# Patient Record
Sex: MALE | Race: BLACK OR AFRICAN AMERICAN | URBAN - NONMETROPOLITAN AREA
[De-identification: names, ages, dates, MRNs, and addresses within clinical notes are randomized per-mention and may not be internally consistent; named-entity substitution may affect disease eponyms.]

---

## 2021-06-03 ENCOUNTER — OFFICE VISIT (OUTPATIENT)
Dept: FAMILY MEDICINE CLINIC | Age: 41
End: 2021-06-03
Payer: COMMERCIAL

## 2021-06-03 VITALS
RESPIRATION RATE: 16 BRPM | TEMPERATURE: 98.6 F | HEART RATE: 97 BPM | HEIGHT: 71 IN | OXYGEN SATURATION: 98 % | DIASTOLIC BLOOD PRESSURE: 80 MMHG | SYSTOLIC BLOOD PRESSURE: 132 MMHG | BODY MASS INDEX: 32.09 KG/M2 | WEIGHT: 229.2 LBS

## 2021-06-03 DIAGNOSIS — R80.9 TYPE 2 DIABETES MELLITUS WITH MICROALBUMINURIA, WITH LONG-TERM CURRENT USE OF INSULIN (HCC): Primary | ICD-10-CM

## 2021-06-03 DIAGNOSIS — Z59.41 FOOD INSECURITY: ICD-10-CM

## 2021-06-03 DIAGNOSIS — Z79.4 TYPE 2 DIABETES MELLITUS WITH MICROALBUMINURIA, WITH LONG-TERM CURRENT USE OF INSULIN (HCC): Primary | ICD-10-CM

## 2021-06-03 DIAGNOSIS — N52.1 ERECTILE DYSFUNCTION DUE TO DISEASES CLASSIFIED ELSEWHERE: ICD-10-CM

## 2021-06-03 DIAGNOSIS — D64.9 ANEMIA, UNSPECIFIED TYPE: ICD-10-CM

## 2021-06-03 DIAGNOSIS — E11.29 TYPE 2 DIABETES MELLITUS WITH MICROALBUMINURIA, WITH LONG-TERM CURRENT USE OF INSULIN (HCC): Primary | ICD-10-CM

## 2021-06-03 DIAGNOSIS — Z13.220 SCREENING FOR HYPERCHOLESTEROLEMIA: ICD-10-CM

## 2021-06-03 LAB
HBA1C MFR BLD: 9.9 % (ref 4.3–5.7)
MICROALB/CREAT RATIO POC: ABNORMAL MG/G
MICROALBUMIN/CREAT UR-RTO: 80 MG/L
POC CREATININE: 300 MG/DL

## 2021-06-03 PROCEDURE — 3046F HEMOGLOBIN A1C LEVEL >9.0%: CPT | Performed by: STUDENT IN AN ORGANIZED HEALTH CARE EDUCATION/TRAINING PROGRAM

## 2021-06-03 PROCEDURE — G8417 CALC BMI ABV UP PARAM F/U: HCPCS | Performed by: STUDENT IN AN ORGANIZED HEALTH CARE EDUCATION/TRAINING PROGRAM

## 2021-06-03 PROCEDURE — 2022F DILAT RTA XM EVC RTNOPTHY: CPT | Performed by: STUDENT IN AN ORGANIZED HEALTH CARE EDUCATION/TRAINING PROGRAM

## 2021-06-03 PROCEDURE — 4004F PT TOBACCO SCREEN RCVD TLK: CPT | Performed by: STUDENT IN AN ORGANIZED HEALTH CARE EDUCATION/TRAINING PROGRAM

## 2021-06-03 PROCEDURE — 99204 OFFICE O/P NEW MOD 45 MIN: CPT | Performed by: STUDENT IN AN ORGANIZED HEALTH CARE EDUCATION/TRAINING PROGRAM

## 2021-06-03 PROCEDURE — G8427 DOCREV CUR MEDS BY ELIG CLIN: HCPCS | Performed by: STUDENT IN AN ORGANIZED HEALTH CARE EDUCATION/TRAINING PROGRAM

## 2021-06-03 RX ORDER — INSULIN GLARGINE 100 [IU]/ML
50 INJECTION, SOLUTION SUBCUTANEOUS NIGHTLY
Qty: 15 PEN | Refills: 1 | Status: SHIPPED | OUTPATIENT
Start: 2021-06-03 | End: 2021-07-21 | Stop reason: SDUPTHER

## 2021-06-03 RX ORDER — LANCETS 30 GAUGE
1 EACH MISCELLANEOUS 3 TIMES DAILY
Qty: 600 EACH | Refills: 0 | Status: SHIPPED | OUTPATIENT
Start: 2021-06-03

## 2021-06-03 RX ORDER — LANCETS 30 GAUGE
1 EACH MISCELLANEOUS DAILY
Qty: 300 EACH | Refills: 1 | Status: CANCELLED | OUTPATIENT
Start: 2021-06-03

## 2021-06-03 RX ORDER — SILDENAFIL 50 MG/1
50 TABLET, FILM COATED ORAL DAILY PRN
Qty: 10 TABLET | Refills: 0 | Status: SHIPPED | OUTPATIENT
Start: 2021-06-03 | End: 2021-06-10

## 2021-06-03 RX ORDER — BLOOD-GLUCOSE METER
1 KIT MISCELLANEOUS DAILY
Qty: 1 KIT | Refills: 0 | Status: SHIPPED | OUTPATIENT
Start: 2021-06-03

## 2021-06-03 RX ORDER — BLOOD PRESSURE TEST KIT
1 KIT MISCELLANEOUS 3 TIMES DAILY
Qty: 100 EACH | Refills: 0 | Status: SHIPPED | OUTPATIENT
Start: 2021-06-03

## 2021-06-03 RX ORDER — GLUCOSAMINE HCL/CHONDROITIN SU 500-400 MG
CAPSULE ORAL
Qty: 300 STRIP | Refills: 0 | Status: SHIPPED | OUTPATIENT
Start: 2021-06-03

## 2021-06-03 RX ORDER — LISINOPRIL 10 MG/1
10 TABLET ORAL DAILY
Qty: 30 TABLET | Refills: 0 | Status: SHIPPED | OUTPATIENT
Start: 2021-06-03

## 2021-06-03 RX ORDER — INSULIN LISPRO 100 [IU]/ML
10 INJECTION, SOLUTION INTRAVENOUS; SUBCUTANEOUS
Qty: 3 PEN | Refills: 1 | Status: SHIPPED | OUTPATIENT
Start: 2021-06-03 | End: 2021-07-21 | Stop reason: SDUPTHER

## 2021-06-03 SDOH — ECONOMIC STABILITY - FOOD INSECURITY: FOOD INSECURITY: Z59.41

## 2021-06-03 SDOH — ECONOMIC STABILITY: FOOD INSECURITY: WITHIN THE PAST 12 MONTHS, THE FOOD YOU BOUGHT JUST DIDN'T LAST AND YOU DIDN'T HAVE MONEY TO GET MORE.: OFTEN TRUE

## 2021-06-03 SDOH — ECONOMIC STABILITY: FOOD INSECURITY: WITHIN THE PAST 12 MONTHS, YOU WORRIED THAT YOUR FOOD WOULD RUN OUT BEFORE YOU GOT MONEY TO BUY MORE.: OFTEN TRUE

## 2021-06-03 ASSESSMENT — PATIENT HEALTH QUESTIONNAIRE - PHQ9
2. FEELING DOWN, DEPRESSED OR HOPELESS: 0
SUM OF ALL RESPONSES TO PHQ QUESTIONS 1-9: 0
1. LITTLE INTEREST OR PLEASURE IN DOING THINGS: 0
SUM OF ALL RESPONSES TO PHQ9 QUESTIONS 1 & 2: 0

## 2021-06-03 ASSESSMENT — SOCIAL DETERMINANTS OF HEALTH (SDOH): HOW HARD IS IT FOR YOU TO PAY FOR THE VERY BASICS LIKE FOOD, HOUSING, MEDICAL CARE, AND HEATING?: VERY HARD

## 2021-06-03 ASSESSMENT — ENCOUNTER SYMPTOMS
ABDOMINAL PAIN: 0
COLOR CHANGE: 0
NAUSEA: 0
VOMITING: 0
COUGH: 0
SHORTNESS OF BREATH: 0

## 2021-06-03 NOTE — PATIENT INSTRUCTIONS
Thank you   1. Thank you for trusting us with your healthcare needs. You may receive a survey regarding today's visit. It would help us out if you would take a few moments to provide your feedback. We value your input. 2. Please bring in ALL medications BOTTLES, including inhalers, herbal supplements, over the counter, prescribed & non-prescribed medicine. The office would like actual medication bottles and a list.   3. Please note our OFFICE POLICIES:   a. Prior to getting your labs drawn, please check with your insurance company for benefits and eligibility of lab services. Often, insurance companies cover certain tests for preventative visits only. It is patient's responsibility to see what is covered. b. We are unable to change a diagnosis after the test has been performed. c. Lab orders will not be re-printed. Please hold onto your original lab orders and take them to your lab to be completed. d. If you no show your scheduled appointment three times, you will be dismissed from this practice. e. Ronna Grime must be completed 24 hours prior to your schedule appointment. 4. If the list below has been completed, PLEASE FAX RECORDS TO OUR OFFICE @ [phone]. Once the records have been received we will update your records at our office:  Health Maintenance Due   Topic Date Due    Hepatitis C screen  Never done    HIV screen  Never done    DTaP/Tdap/Td vaccine (1 - Tdap) Never done    Lipid screen  Never done    Diabetes screen  Never done           Tobacco Cessation Programs     Telephonic behavior modification  1-800-QUIT-NOW (140-2948)Patient Education        Home Blood Sugar Test: About This Test  What is it? A home blood sugar test measures the amount of sugar (glucose) in your blood, using a small device called a blood sugar meter. It's a quick way to test your blood sugar anywhere, at any time. Why is this test done?   Testing your blood sugar helps you know if your levels are in your target range. It helps you know when to take action and may help you avoid blood sugar emergencies. Testing also helps you learn how things like exercise, stress, and what you eat can affect your blood sugar. What happens before the test?  The supplies you will need for testing blood sugar include:  · A blood glucose meter. · Testing strips. These are made to be used with a specific model of meter. Make sure the strips haven't . · Sugar control solutions. Some meters require a specific solution. Many new meters are made to operate without a control solution. · Short needles called lancets for pricking your skin. · A pen-sized castillo for the lancet (lancet device). It positions the lancet and controls how deeply it goes into your skin. · Clean cotton balls. These are used to stop the bleeding from the testing site. What happens during the test?  Checking your blood sugar involves pricking your finger, palm, or forearm with a lancet to collect a drop of blood. The blood drop is placed on a test strip, which you insert into the blood glucose meter. The instructions for testing are slightly different for each blood glucose meter model. Follow the instructions that came with your meter. · Wash your hands with warm, soapy water. Dry them well with a clean towel. You may also use an alcohol wipe to clean your finger or other site. But make sure your hands are dry before the test.  · Insert a clean lancet into the lancet device. · Remove a test strip from the test strip bottle. Replace the lid right away to keep moisture away from the other strips. · Follow the instructions that came with your meter to get it ready. · Use the lancet device to stick the side of your fingertip with the lancet. Do not stick the tip of your finger. Some blood sugar meters use lancet devices that take the blood sample from other sites, such as the palm of the hand or the forearm.  But the finger is usually the most accurate http://[place]. [employer]uit[employer]. org   Online support program to help patients through each step of the quitting process. Available 24 hours a day 7 days a week. Provides up to date researched based tool, step-by-step guides, and motivational messages. Online behavior modification   www.lungusa.org/stop-smoking/how-to-quit   HelpLine: 1-800-LUNG-USA (093-6916)   Email questions to:  Shraddha@[employer]. org    Website offers resources to help tobacco users figure out their reasons for quitting and then take the big step of quitting for good. Hypnosis   Location: [facility] [employer] [facility], [employer] [address]   Contact: Claire Sultana, PhD at [phone]   Hypnosis for tobacco cessation   Cost $225 for the initial session and $175 for each session afterwards. Most patients require 6-8 sessions. There is the option to submit through the patients insurance. Hypnosis and behavior modification   Location: [facility],  [address] [employer] [address]   Contact: Chance Reyes, PhD at [phone]  [facility] and hypnosis for nicotine addition   Cost: For uninsured patients:  Please call above phone number  Cost for insured patients depends on patients insurance plan. Behavior modification   Location: [facility], [facility] [employer] [address]   Contact: Grady Segura include four one-on-one appointments between the patient and a respiratory therapist.  The four appointments span over three weeks. The respiratory therapist schedules one of the appointments to occur 48 hours after the patients quit date.  Cost $100 total for the four sessions.   Tobacco cessation products are not included in the cost and are not provided by [facility].

## 2021-06-03 NOTE — LETTER
[address] [place] [address],[address] [facility] [address] [place], [place] [address]  Phone: [phone]  Fax: [phone]    Ambar Mahoney MD        Annemarie 3, 2021     Patient: Kristina Collins   YOB: 1980   Date of Visit: 6/3/2021       To Whom It May Concern: It is my medical opinion that Travis Saab should be allowed to have outside food brought in because of his Type 2 Diabetes. He is on Insulin and needs to maintain a specific diet in order to keep his diabetes under control. If you have any questions or concerns, please don't hesitate to call.     Sincerely,        Ambar Mahoney MD

## 2021-06-03 NOTE — PROGRESS NOTES
[address] [facility] [address] [address]  Dept: [phone]  Dept Fax: [phone]  Loc: Td Davenport is a 41 y.o. male who presents today for:  Chief Complaint   Patient presents with    New Patient     Establish and Discuss Diabetes       HPI:   New patient, here to establish care. DM: Hx of DM since 2008; has been on insulin since 2012. Patient recently released from prison, needs to re-establish with a PCP, has not seen a PCP in many years. States that he was receiving insulin while in prison. He was released 2 days and states they sent him out with a few day supply of insulin but the vials are all broken. He has been on Humalong 35units TID and sliding scale. Now staying at a [facility] and worried his diet is really bad, they do not provide anything healthy for him to eat. Erectile dysfunction: Had dealt with ED for many years secondary to his DM and it's affecting his relationship with his girlfriend now, would like to be on medication. Hx of HTN but not on medications any longer. Past Medical History:   Diagnosis Date    Hypertension     Type 2 diabetes mellitus without complication ([address]       History reviewed. No pertinent surgical history. Family History   Problem Relation Age of Onset    Diabetes Mother     High Blood Pressure Mother     High Cholesterol Mother     Anxiety Disorder Mother     Depression Mother      Social History     Tobacco Use    Smoking status: Current Some Day Smoker     Types: Cigarettes    Smokeless tobacco: Never Used   Substance Use Topics    Alcohol use: Not Currently      Current Outpatient Medications   Medication Sig Dispense Refill    blood glucose monitor strips Test 3 times a day before meals & as needed for symptoms of irregular blood glucose.  Dispense sufficient amount for indicated testing frequency plus additional to accommodate PRN testing needs. 300 strip 0    glucose monitoring kit (FREESTYLE) monitoring kit 1 kit by Does not apply route daily 1 kit 0    lisinopril (PRINIVIL;ZESTRIL) 10 MG tablet Take 1 tablet by mouth daily 30 tablet 0    Lancets MISC 1 each by Does not apply route 3 times daily 600 each 0    Alcohol Swabs PADS 1 box by Does not apply route 3 times daily 100 each 0    insulin glargine (LANTUS SOLOSTAR) 100 UNIT/ML injection pen Inject 50 Units into the skin nightly 15 pen 1    insulin lispro, 1 Unit Dial, (HUMALOG KWIKPEN) 100 UNIT/ML SOPN Inject 10 Units into the skin 3 times daily (before meals) Inject 10 units into skin 3 times daily with meals + 2units for 150-200; 4units 200-250; 6 units 300-350; 8 units  3 pen 1    sildenafil (VIAGRA) 50 MG tablet Take 1 tablet by mouth daily as needed for Erectile Dysfunction 10 tablet 0     No current facility-administered medications for this visit. No Known Allergies    Health Maintenance   Topic Date Due    Pneumococcal 0-64 years Vaccine (1 of 2 - PPSV23) Never done    Diabetic foot exam  Never done    Diabetic retinal exam  Never done    Lipid screen  Never done    Hepatitis B vaccine (1 of 3 - Risk 3-dose series) Never done    DTaP/Tdap/Td vaccine (1 - Tdap) Never done    Potassium monitoring  05/20/2013    Creatinine monitoring  05/20/2013    Hepatitis C screen  06/03/2022 (Originally 1980)    HIV screen  06/03/2022 (Originally 12/12/1993)    Flu vaccine (Season Ended) 09/01/2021    A1C test (Diabetic or Prediabetic)  09/03/2021    COVID-19 Vaccine  Completed    Hepatitis A vaccine  Aged Out    Hib vaccine  Aged Out    Meningococcal (ACWY) vaccine  Aged Out       Subjective:      Review of Systems   Constitutional: Negative for chills and fever. Respiratory: Negative for cough and shortness of breath. Cardiovascular: Negative for chest pain and palpitations. Gastrointestinal: Negative for abdominal pain, nausea and vomiting. Skin: Negative for color change and rash. Neurological: Negative for weakness, numbness and headaches. Psychiatric/Behavioral: Negative for decreased concentration. The patient is not nervous/anxious. Objective:     Vitals:    06/03/21 1406   BP: 132/80   Site: Left Upper Arm   Position: Sitting   Cuff Size: Large Adult   Pulse: 97   Resp: 16   Temp: 98.6 °F (37 °C)   TempSrc: Oral   SpO2: 98%   Weight: 229 lb 3.2 oz (104 kg)   Height: 5' 11\" (1.803 m)       Body mass index is 31.97 kg/m². Wt Readings from Last 3 Encounters:   06/03/21 229 lb 3.2 oz (104 kg)     BP Readings from Last 3 Encounters:   06/03/21 132/80       Physical Exam  Vitals reviewed. Constitutional:       Appearance: He is obese. HENT:      Head: Normocephalic and atraumatic. Right Ear: External ear normal.      Left Ear: External ear normal.   Eyes:      Conjunctiva/sclera: Conjunctivae normal.   Cardiovascular:      Rate and Rhythm: Normal rate and regular rhythm. Pulses: Normal pulses. Heart sounds: Normal heart sounds. Pulmonary:      Effort: Pulmonary effort is normal.      Breath sounds: Normal breath sounds. Musculoskeletal:      Right lower leg: No edema. Left lower leg: No edema. Skin:     General: Skin is warm and dry. Neurological:      Mental Status: He is oriented to person, place, and time. Psychiatric:         Mood and Affect: Mood normal.         Behavior: Behavior normal.         Lab Results   Component Value Date    WBC 5.6 05/17/2012    HCT 41.3 (L) 05/17/2012     05/17/2012     05/20/2012    K 4.1 05/20/2012     05/20/2012    CREATININE 300 06/03/2021    BUN 12 05/20/2012    CO2 32 05/20/2012    LABA1C 9.9 (H) 06/03/2021    LABGLOM >90 05/20/2012       Imaging Results:    No results found. Assessment / Plan:     Gumaro Price was seen today for new patient.     Diagnoses and all orders for this visit:    Type 2 diabetes mellitus with microalbuminuria, with long-term current use of insulin ([facility]  - Uncontrolled. A1c 9.9%   - Will start on Lantus 50 units nightly and Humalog 10units TID with meals with sliding scale  - Monitor blood sugars at home  - Microalbuminuria today - will start on Lisinopril  - Check lipid panel for LDL - will need statin therapy  - Will need DM foot exam and retinal exam   -     POCT microalbumin  -     POCT glycosylated hemoglobin (Hb A1C)  -     Comprehensive Metabolic Panel; Future  -     blood glucose monitor strips; Test 3 times a day before meals & as needed for symptoms of irregular blood glucose. Dispense sufficient amount for indicated testing frequency plus additional to accommodate PRN testing needs.  -     glucose monitoring kit (FREESTYLE) monitoring kit; 1 kit by Does not apply route daily  -     lisinopril (PRINIVIL;ZESTRIL) 10 MG tablet; Take 1 tablet by mouth daily  -     TSH; Future  -     T4, Free; Future  -     Lancets MISC; 1 each by Does not apply route 3 times daily  -     Alcohol Swabs PADS; 1 box by Does not apply route 3 times daily  -     insulin glargine (LANTUS SOLOSTAR) 100 UNIT/ML injection pen; Inject 50 Units into the skin nightly  -     insulin lispro, 1 Unit Dial, (HUMALOG KWIKPEN) 100 UNIT/ML SOPN; Inject 10 Units into the skin 3 times daily (before meals) Inject 10 units into skin 3 times daily with meals + 2units for 150-200; 4units 200-250; 6 units 300-350; 8 units     Screening for hypercholesterolemia:  - Check fasting lipid panel  -     Lipid Panel; Future    Anemia, unspecified type:  - Check CBC  -     CBC; Future    Erectile dysfunction due to diseases classified elsewhere:  - Secondary to uncontrolled DM. Will start on Viagra 50mg daily PRN  -     sildenafil (VIAGRA) 50 MG tablet; Take 1 tablet by mouth daily as needed for Erectile Dysfunction    Food insecurity  - Food box and resource list provided.  Voucher to [employer] provided          Return in about 2 weeks (around 6/17/2021) for diabetes follow-up. Medications Prescribed:  Orders Placed This Encounter   Medications    blood glucose monitor strips     Sig: Test 3 times a day before meals & as needed for symptoms of irregular blood glucose. Dispense sufficient amount for indicated testing frequency plus additional to accommodate PRN testing needs. Dispense:  300 strip     Refill:  0     Brand per patient preference. May round up to next available package size.  glucose monitoring kit (FREESTYLE) monitoring kit     Si kit by Does not apply route daily     Dispense:  1 kit     Refill:  0    lisinopril (PRINIVIL;ZESTRIL) 10 MG tablet     Sig: Take 1 tablet by mouth daily     Dispense:  30 tablet     Refill:  0    Lancets MISC     Si each by Does not apply route 3 times daily     Dispense:  600 each     Refill:  0    Alcohol Swabs PADS     Si box by Does not apply route 3 times daily     Dispense:  100 each     Refill:  0    insulin glargine (LANTUS SOLOSTAR) 100 UNIT/ML injection pen     Sig: Inject 50 Units into the skin nightly     Dispense:  15 pen     Refill:  1    insulin lispro, 1 Unit Dial, (HUMALOG KWIKPEN) 100 UNIT/ML SOPN     Sig: Inject 10 Units into the skin 3 times daily (before meals) Inject 10 units into skin 3 times daily with meals + 2units for 150-200; 4units 200-250; 6 units 300-350; 8 units      Dispense:  3 pen     Refill:  1    sildenafil (VIAGRA) 50 MG tablet     Sig: Take 1 tablet by mouth daily as needed for Erectile Dysfunction     Dispense:  10 tablet     Refill:  0       Future Appointments   Date Time Provider Danyel Katrina   2021  1:40 PM Chipper Seip,  [address]       Patient given educational materials - see patient instructions. Discussed use, benefit, and sideeffects of prescribed medications. All patient questions answered. Pt voiced understanding. Reviewed health maintenance. Instructed to continue current medications, diet and exercise.   Patient agreed with treatment plan. Follow up as directed.      Electronically signed by Mary Osorio MD on 6/3/2021 at 4:44 PM

## 2021-06-05 LAB
ALBUMIN SERPL-MCNC: 4.5 G/DL
ALP BLD-CCNC: 75 U/L
ALT SERPL-CCNC: 18 U/L
ANION GAP SERPL CALCULATED.3IONS-SCNC: 7 MMOL/L
AST SERPL-CCNC: 30 U/L
BASOPHILS ABSOLUTE: 0.1 /ΜL
BASOPHILS RELATIVE PERCENT: 1 %
BILIRUB SERPL-MCNC: 0.4 MG/DL (ref 0.1–1.4)
BUN BLDV-MCNC: 16 MG/DL
CALCIUM SERPL-MCNC: 9.6 MG/DL
CHLORIDE BLD-SCNC: 108 MMOL/L
CHOLESTEROL, TOTAL: 160 MG/DL
CHOLESTEROL/HDL RATIO: 4.6
CO2: 28 MMOL/L
CREAT SERPL-MCNC: 1.15 MG/DL
EOSINOPHILS ABSOLUTE: ABNORMAL
EOSINOPHILS RELATIVE PERCENT: ABNORMAL
GFR CALCULATED: NORMAL
GLUCOSE BLD-MCNC: 58 MG/DL
HCT VFR BLD CALC: 40.9 % (ref 41–53)
HDLC SERPL-MCNC: 35 MG/DL (ref 35–70)
HEMOGLOBIN: 13.2 G/DL (ref 13.5–17.5)
LDL CHOLESTEROL CALCULATED: 110 MG/DL (ref 0–160)
LYMPHOCYTES ABSOLUTE: 2.5 /ΜL
LYMPHOCYTES RELATIVE PERCENT: 39.8 %
MCH RBC QN AUTO: 25.6 PG
MCHC RBC AUTO-ENTMCNC: 32.3 G/DL
MCV RBC AUTO: 79 FL
MONOCYTES ABSOLUTE: 0.4 /ΜL
MONOCYTES RELATIVE PERCENT: 5.8 %
NEUTROPHILS ABSOLUTE: 3.3 /ΜL
NEUTROPHILS RELATIVE PERCENT: 53.4 %
NONHDLC SERPL-MCNC: ABNORMAL MG/DL
PDW BLD-RTO: 15.5 %
PLATELET # BLD: 346 K/ΜL
PMV BLD AUTO: 9.2 FL
POTASSIUM SERPL-SCNC: 3.5 MMOL/L
RBC # BLD: 5.16 10^6/ΜL
SODIUM BLD-SCNC: 143 MMOL/L
T4 FREE: 0.99
TOTAL PROTEIN: 7.5
TRIGL SERPL-MCNC: 77 MG/DL
TSH SERPL DL<=0.05 MIU/L-ACNC: 0.75 UIU/ML
VLDLC SERPL CALC-MCNC: 15 MG/DL
WBC # BLD: 6.2 10^3/ML

## 2021-06-07 NOTE — TELEPHONE ENCOUNTER
Pt called for a refill on his screw on needles for his insulin. He is needing these ASAP, would like a call back once it called in. #[phone].     Uses [facility]

## 2021-06-15 ENCOUNTER — OFFICE VISIT (OUTPATIENT)
Dept: FAMILY MEDICINE CLINIC | Age: 41
End: 2021-06-15
Payer: COMMERCIAL

## 2021-06-15 VITALS
HEART RATE: 97 BPM | OXYGEN SATURATION: 98 % | SYSTOLIC BLOOD PRESSURE: 132 MMHG | RESPIRATION RATE: 16 BRPM | TEMPERATURE: 98.8 F | BODY MASS INDEX: 32.31 KG/M2 | HEIGHT: 71 IN | DIASTOLIC BLOOD PRESSURE: 66 MMHG | WEIGHT: 230.8 LBS

## 2021-06-15 DIAGNOSIS — N52.1 ERECTILE DYSFUNCTION DUE TO DISEASES CLASSIFIED ELSEWHERE: Primary | ICD-10-CM

## 2021-06-15 DIAGNOSIS — G89.29 CHRONIC RIGHT-SIDED LOW BACK PAIN WITHOUT SCIATICA: ICD-10-CM

## 2021-06-15 DIAGNOSIS — M54.50 CHRONIC RIGHT-SIDED LOW BACK PAIN WITHOUT SCIATICA: ICD-10-CM

## 2021-06-15 PROCEDURE — 4004F PT TOBACCO SCREEN RCVD TLK: CPT | Performed by: STUDENT IN AN ORGANIZED HEALTH CARE EDUCATION/TRAINING PROGRAM

## 2021-06-15 PROCEDURE — G8427 DOCREV CUR MEDS BY ELIG CLIN: HCPCS | Performed by: STUDENT IN AN ORGANIZED HEALTH CARE EDUCATION/TRAINING PROGRAM

## 2021-06-15 PROCEDURE — 99213 OFFICE O/P EST LOW 20 MIN: CPT | Performed by: STUDENT IN AN ORGANIZED HEALTH CARE EDUCATION/TRAINING PROGRAM

## 2021-06-15 PROCEDURE — G8417 CALC BMI ABV UP PARAM F/U: HCPCS | Performed by: STUDENT IN AN ORGANIZED HEALTH CARE EDUCATION/TRAINING PROGRAM

## 2021-06-15 RX ORDER — TADALAFIL 10 MG/1
10 TABLET ORAL DAILY PRN
Qty: 30 TABLET | Refills: 0 | Status: SHIPPED | OUTPATIENT
Start: 2021-06-15

## 2021-06-15 RX ORDER — CYCLOBENZAPRINE HCL 10 MG
10 TABLET ORAL NIGHTLY PRN
Qty: 10 TABLET | Refills: 0 | Status: SHIPPED | OUTPATIENT
Start: 2021-06-15 | End: 2021-06-25

## 2021-06-15 NOTE — PROGRESS NOTES
Health Maintenance Due   Topic Date Due    Pneumococcal 0-64 years Vaccine (1 of 2 - PPSV23) Never done    Diabetic foot exam  Never done    Diabetic retinal exam  Never done    Hepatitis B vaccine (1 of 3 - Risk 3-dose series) Never done    DTaP/Tdap/Td vaccine (1 - Tdap) Never done

## 2021-06-15 NOTE — PROGRESS NOTES
[id number] [facility] [address] [address]  Dept: [phone]  Dept Fax: [phone]  Loc: Catherine Aguilar is a 41 y.o. male who presents today for:  Chief Complaint   Patient presents with    Discuss Medications     viagra    Back Pain       HPI:   Patient here to discuss his ED and back pain:    ED: Viagra was not covered by insurance. Patient would like something else. ED is causing a lot of strain in his relationship. He's dealt with the issue due to his diabetes. Back pain: Chronic muscle pain left lower back due to heavy lifting at the gym. Feels like muscles are always tight. No red flag symptoms. Muscle relaxants have worked for him in the past. Has not tried PT. Past Medical History:   Diagnosis Date    Hypertension     Type 2 diabetes mellitus without complication ([address]       History reviewed. No pertinent surgical history. Family History   Problem Relation Age of Onset    Diabetes Mother     High Blood Pressure Mother     High Cholesterol Mother     Anxiety Disorder Mother     Depression Mother      Social History     Tobacco Use    Smoking status: Current Some Day Smoker     Types: Cigarettes    Smokeless tobacco: Never Used    Tobacco comment: 1 pack last 4 days   Substance Use Topics    Alcohol use: Not Currently      Current Outpatient Medications   Medication Sig Dispense Refill    tadalafil (CIALIS) 10 MG tablet Take 1 tablet by mouth daily as needed for Erectile Dysfunction 30 tablet 0    cyclobenzaprine (FLEXERIL) 10 MG tablet Take 1 tablet by mouth nightly as needed for Muscle spasms 10 tablet 0    Insulin Pen Needle 31G X 8 MM MISC 1 each by Does not apply route daily 100 each 3    blood glucose monitor strips Test 3 times a day before meals & as needed for symptoms of irregular blood glucose.  Dispense sufficient amount for indicated testing frequency plus additional to accommodate PRN testing needs. 300 strip 0    glucose monitoring kit (FREESTYLE) monitoring kit 1 kit by Does not apply route daily 1 kit 0    lisinopril (PRINIVIL;ZESTRIL) 10 MG tablet Take 1 tablet by mouth daily 30 tablet 0    Lancets MISC 1 each by Does not apply route 3 times daily 600 each 0    Alcohol Swabs PADS 1 box by Does not apply route 3 times daily 100 each 0    insulin glargine (LANTUS SOLOSTAR) 100 UNIT/ML injection pen Inject 50 Units into the skin nightly 15 pen 1    insulin lispro, 1 Unit Dial, (HUMALOG KWIKPEN) 100 UNIT/ML SOPN Inject 10 Units into the skin 3 times daily (before meals) Inject 10 units into skin 3 times daily with meals + 2units for 150-200; 4units 200-250; 6 units 300-350; 8 units  3 pen 1     No current facility-administered medications for this visit. No Known Allergies    Health Maintenance   Topic Date Due    Pneumococcal 0-64 years Vaccine (1 of 2 - PPSV23) Never done    Diabetic foot exam  Never done    Diabetic retinal exam  Never done    Hepatitis B vaccine (1 of 3 - Risk 3-dose series) Never done    DTaP/Tdap/Td vaccine (1 - Tdap) Never done    Hepatitis C screen  06/03/2022 (Originally 1980)    HIV screen  06/03/2022 (Originally 12/12/1993)    Flu vaccine (Season Ended) 09/01/2021    A1C test (Diabetic or Prediabetic)  09/03/2021    Lipid screen  06/05/2022    Potassium monitoring  06/05/2022    Creatinine monitoring  06/05/2022    COVID-19 Vaccine  Completed    Hepatitis A vaccine  Aged Out    Hib vaccine  Aged Out    Meningococcal (ACWY) vaccine  Aged Out       Subjective:      Review of Systems   Constitutional: Negative for chills and fever. Respiratory: Negative for cough and shortness of breath. Cardiovascular: Negative for chest pain. Gastrointestinal: Negative for nausea and vomiting. Musculoskeletal: Positive for back pain. Negative for gait problem. Skin: Negative for color change and rash.    Neurological: Negative for weakness and numbness. Psychiatric/Behavioral: Negative for decreased concentration. The patient is not nervous/anxious. Objective:     Vitals:    06/15/21 1505   BP: 132/66   Site: Left Upper Arm   Position: Sitting   Cuff Size: Large Adult   Pulse: 97   Resp: 16   Temp: 98.8 °F (37.1 °C)   TempSrc: Oral   SpO2: 98%   Weight: 230 lb 12.8 oz (104.7 kg)   Height: 5' 11\" (1.803 m)       Body mass index is 32.19 kg/m². Wt Readings from Last 3 Encounters:   06/15/21 230 lb 12.8 oz (104.7 kg)   06/10/21 230 lb 12.8 oz (104.7 kg)   06/03/21 229 lb 3.2 oz (104 kg)     BP Readings from Last 3 Encounters:   06/15/21 132/66   06/10/21 124/72   06/03/21 132/80       Physical Exam  Vitals reviewed. Constitutional:       Appearance: Normal appearance. HENT:      Head: Normocephalic and atraumatic. Right Ear: External ear normal.      Left Ear: External ear normal.   Cardiovascular:      Rate and Rhythm: Normal rate and regular rhythm. Pulses: Normal pulses. Heart sounds: Normal heart sounds. Pulmonary:      Effort: Pulmonary effort is normal.      Breath sounds: Normal breath sounds. Musculoskeletal:         General: Tenderness (Left lower back) present. Skin:     General: Skin is warm and dry. Neurological:      Mental Status: He is oriented to person, place, and time.    Psychiatric:         Mood and Affect: Mood normal.         Behavior: Behavior normal.         Lab Results   Component Value Date    WBC 6.2 06/05/2021    HGB 13.2 (A) 06/05/2021    HCT 40.9 (A) 06/05/2021     06/05/2021    CHOL 160 06/05/2021    TRIG 77 06/05/2021    HDL 35 (L) 06/05/2021    LDLCALC 110 06/05/2021    AST 30 06/05/2021     06/05/2021    K 3.5 06/05/2021     06/05/2021    CREATININE 1.15 06/05/2021    BUN 16 06/05/2021    CO2 28 06/05/2021    TSH 0.75 06/05/2021    LABA1C 9.9 (H) 06/03/2021    LABGLOM >90 05/20/2012    CALCIUM 9.6 06/05/2021       Imaging Results:    No results found. Assessment / Plan:     Lexis Grewal was seen today for discuss medications and back pain. Diagnoses and all orders for this visit:    Erectile dysfunction due to diseases classified elsewhere:  - Secondary to uncontrolled DM. Will start Cialis. Advised to use every other day as needed. -     tadalafil (CIALIS) 10 MG tablet; Take 1 tablet by mouth daily as needed for Erectile Dysfunction    Chronic right-sided low back pain without sciatica:  - No neurological symptoms. Likely muscle spasm due to heavy lifting at gym. No need for imaging at this time. - Will trial Flexeril. Okay to use NSAIDs as needed, as well. -     cyclobenzaprine (FLEXERIL) 10 MG tablet; Take 1 tablet by mouth nightly as needed for Muscle spasms         Return if symptoms worsen or fail to improve, for already has an appt on Thursday. Medications Prescribed:  Orders Placed This Encounter   Medications    tadalafil (CIALIS) 10 MG tablet     Sig: Take 1 tablet by mouth daily as needed for Erectile Dysfunction     Dispense:  30 tablet     Refill:  0    cyclobenzaprine (FLEXERIL) 10 MG tablet     Sig: Take 1 tablet by mouth nightly as needed for Muscle spasms     Dispense:  10 tablet     Refill:  0       Future Appointments   Date Time Provider Danyel Herndon   6/17/2021  1:40 PM [facility] [address] [facility]       Patient given educational materials - see patient instructions. Discussed use, benefit, and sideeffects of prescribed medications. All patient questions answered. Pt voiced understanding. Reviewed health maintenance. Instructed to continue current medications, diet and exercise. Patient agreed with treatment plan. Follow up as directed.      Electronically signed by Kieran Julian MD on 6/16/2021 at 7:05 PM

## 2021-06-15 NOTE — PATIENT INSTRUCTIONS
Patient Education        Back Stretches: Exercises  Introduction  Here are some examples of exercises for stretching your back. Start each exercise slowly. Ease off the exercise if you start to have pain. Your doctor or physical therapist will tell you when you can start these exercises and which ones will work best for you. How to do the exercises  Overhead stretch   1. Stand comfortably with your feet shoulder-width apart. 2. Looking straight ahead, raise both arms over your head and reach toward the ceiling. Do not allow your head to tilt back. 3. Hold for 15 to 30 seconds, then lower your arms to your sides. 4. Repeat 2 to 4 times. Side stretch   1. Stand comfortably with your feet shoulder-width apart. 2. Raise one arm over your head, and then lean to the other side. 3. Slide your hand down your leg as you let the weight of your arm gently stretch your side muscles. Hold for 15 to 30 seconds. 4. Repeat 2 to 4 times on each side. Press-up   1. Lie on your stomach, supporting your body with your forearms. 2. Press your elbows down into the floor to raise your upper back. As you do this, relax your stomach muscles and allow your back to arch without using your back muscles. As your press up, do not let your hips or pelvis come off the floor. 3. Hold for 15 to 30 seconds, then relax. 4. Repeat 2 to 4 times. Relax and rest   1. Lie on your back with a rolled towel under your neck and a pillow under your knees. Extend your arms comfortably to your sides. 2. Relax and breathe normally. 3. Remain in this position for about 10 minutes. 4. If you can, do this 2 or 3 times each day. Follow-up care is a key part of your treatment and safety. Be sure to make and go to all appointments, and call your doctor if you are having problems. It's also a good idea to know your test results and keep a list of the medicines you take. Where can you learn more? Go to [email or web address] and sign in to your [employer] account. Enter R686 in the Myze box to learn more about \"Back Stretches: Exercises. \"     If you do not have an account, please click on the \"Sign Up Now\" link. Current as of: November 16, 2020               Content Version: 12.8  © 6509-8311 [employer], Inc[employer]. Care instructions adapted under license by [facility]. If you have questions about a medical condition or this instruction, always ask your healthcare professional. [address] any warranty or liability for your use of this information.

## 2021-06-16 ASSESSMENT — ENCOUNTER SYMPTOMS
VOMITING: 0
BACK PAIN: 1
SHORTNESS OF BREATH: 0
COLOR CHANGE: 0
COUGH: 0
NAUSEA: 0

## 2021-06-16 NOTE — PROGRESS NOTES
SUBJECTIVE     Jarred Field is a 41 y.o.male    Chief Complaint   Patient presents with    Discuss Medications     viagra    Back Pain       Chief complaint, [place], and all pertinent details of the case reviewed with the resident. Please see resident's note for specific details discussed at today's visit. Patient Active Problem List   Diagnosis    Type 2 diabetes mellitus with microalbuminuria, with long-term current use of insulin (HCC)       Current Outpatient Medications   Medication Sig Dispense Refill    tadalafil (CIALIS) 10 MG tablet Take 1 tablet by mouth daily as needed for Erectile Dysfunction 30 tablet 0    cyclobenzaprine (FLEXERIL) 10 MG tablet Take 1 tablet by mouth nightly as needed for Muscle spasms 10 tablet 0    Insulin Pen Needle 31G X 8 MM MISC 1 each by Does not apply route daily 100 each 3    blood glucose monitor strips Test 3 times a day before meals & as needed for symptoms of irregular blood glucose. Dispense sufficient amount for indicated testing frequency plus additional to accommodate PRN testing needs. 300 strip 0    glucose monitoring kit (FREESTYLE) monitoring kit 1 kit by Does not apply route daily 1 kit 0    lisinopril (PRINIVIL;ZESTRIL) 10 MG tablet Take 1 tablet by mouth daily 30 tablet 0    Lancets MISC 1 each by Does not apply route 3 times daily 600 each 0    Alcohol Swabs PADS 1 box by Does not apply route 3 times daily 100 each 0    insulin glargine (LANTUS SOLOSTAR) 100 UNIT/ML injection pen Inject 50 Units into the skin nightly 15 pen 1    insulin lispro, 1 Unit Dial, (HUMALOG KWIKPEN) 100 UNIT/ML SOPN Inject 10 Units into the skin 3 times daily (before meals) Inject 10 units into skin 3 times daily with meals + 2units for 150-200; 4units 200-250; 6 units 300-350; 8 units  3 pen 1     No current facility-administered medications for this visit.        Review of Systems per Dr. Moo Dickinson     /66 (Site: Left Upper Arm, Position: Sitting, Cuff Size: Large Adult)   Pulse 97   Temp 98.8 °F (37.1 °C) (Oral)   Resp 16   Ht 5' 11\" (1.803 m)   Wt 230 lb 12.8 oz (104.7 kg)   SpO2 98%   BMI 32.19 kg/m²   BP Readings from Last 3 Encounters:   06/15/21 132/66   06/10/21 124/72   06/03/21 132/80       Wt Readings from Last 3 Encounters:   06/15/21 230 lb 12.8 oz (104.7 kg)   06/10/21 230 lb 12.8 oz (104.7 kg)   06/03/21 229 lb 3.2 oz (104 kg)     Body mass index is 32.19 kg/m². Physical Exam per Dr. Latha Sarmiento      Immunization History   Administered Date(s) Administered    COVID-19, J&J, PF, 0.5 mL 03/13/2021       Health Maintenance   Topic Date Due    Pneumococcal 0-64 years Vaccine (1 of 2 - PPSV23) Never done    Diabetic foot exam  Never done    Diabetic retinal exam  Never done    Hepatitis B vaccine (1 of 3 - Risk 3-dose series) Never done    DTaP/Tdap/Td vaccine (1 - Tdap) Never done    Hepatitis C screen  06/03/2022 (Originally 1978)    HIV screen  06/03/2022 (Originally 12/12/1993)    Flu vaccine (Season Ended) 09/01/2021    A1C test (Diabetic or Prediabetic)  09/03/2021    Lipid screen  06/05/2022    Potassium monitoring  06/05/2022    Creatinine monitoring  06/05/2022    COVID-19 Vaccine  Completed    Hepatitis A vaccine  Aged Out    Hib vaccine  Aged Out    Meningococcal (ACWY) vaccine  Aged Out            Future Appointments   Date Time Provider Danyel Herndon   6/17/2021  1:40 PM Alfredo Lopes MD [facility]       ASSESSMENT       Diagnosis Orders   1. Erectile dysfunction due to diseases classified elsewhere  tadalafil (CIALIS) 10 MG tablet   2. Chronic right-sided low back pain without sciatica  cyclobenzaprine (FLEXERIL) 10 MG tablet       PLAN      After discussion with Dr. Latha Sarmiento, we agreed on plan as follows:     Will start Cialis 10 mg every other day as needed for erectile dysfunction #30/no refills  Refill Flexeril 10 mg - 1 pill 3 times daily as needed for muscle spasm #21/no refills  Follow-up in 2 days for diabetes recheck as previously planned. Attending Physician Statement  I have discussed the case, including pertinent history and exam findings with the resident. I agree with the documented assessment and plan as documented by the resident. GE modifier added  to this encounter     Electronically signed by Mireille Luna MD on 6/16/2021 at 9:37 PM   Controlled Substance Monitoring:    Acute and Chronic Pain Monitoring:   RX Monitoring 6/16/2021   Periodic Controlled Substance Monitoring No signs of potential drug abuse or diversion identified.

## 2021-06-17 ENCOUNTER — TELEPHONE (OUTPATIENT)
Dept: FAMILY MEDICINE CLINIC | Age: 41
End: 2021-06-17

## 2021-06-17 ENCOUNTER — OFFICE VISIT (OUTPATIENT)
Dept: FAMILY MEDICINE CLINIC | Age: 41
End: 2021-06-17
Payer: COMMERCIAL

## 2021-06-17 VITALS
BODY MASS INDEX: 32.23 KG/M2 | DIASTOLIC BLOOD PRESSURE: 74 MMHG | SYSTOLIC BLOOD PRESSURE: 120 MMHG | TEMPERATURE: 98.2 F | HEART RATE: 81 BPM | RESPIRATION RATE: 16 BRPM | WEIGHT: 230.2 LBS | OXYGEN SATURATION: 98 % | HEIGHT: 71 IN

## 2021-06-17 DIAGNOSIS — E78.2 MIXED HYPERLIPIDEMIA: ICD-10-CM

## 2021-06-17 DIAGNOSIS — Z79.4 TYPE 2 DIABETES MELLITUS WITH MICROALBUMINURIA, WITH LONG-TERM CURRENT USE OF INSULIN (HCC): Primary | ICD-10-CM

## 2021-06-17 DIAGNOSIS — E11.29 TYPE 2 DIABETES MELLITUS WITH MICROALBUMINURIA, WITH LONG-TERM CURRENT USE OF INSULIN (HCC): Primary | ICD-10-CM

## 2021-06-17 DIAGNOSIS — R80.9 TYPE 2 DIABETES MELLITUS WITH MICROALBUMINURIA, WITH LONG-TERM CURRENT USE OF INSULIN (HCC): Primary | ICD-10-CM

## 2021-06-17 DIAGNOSIS — B49 FUNGUS INFECTION: ICD-10-CM

## 2021-06-17 PROCEDURE — G8417 CALC BMI ABV UP PARAM F/U: HCPCS | Performed by: STUDENT IN AN ORGANIZED HEALTH CARE EDUCATION/TRAINING PROGRAM

## 2021-06-17 PROCEDURE — 4004F PT TOBACCO SCREEN RCVD TLK: CPT | Performed by: STUDENT IN AN ORGANIZED HEALTH CARE EDUCATION/TRAINING PROGRAM

## 2021-06-17 PROCEDURE — 3046F HEMOGLOBIN A1C LEVEL >9.0%: CPT | Performed by: STUDENT IN AN ORGANIZED HEALTH CARE EDUCATION/TRAINING PROGRAM

## 2021-06-17 PROCEDURE — 99214 OFFICE O/P EST MOD 30 MIN: CPT | Performed by: STUDENT IN AN ORGANIZED HEALTH CARE EDUCATION/TRAINING PROGRAM

## 2021-06-17 PROCEDURE — 2022F DILAT RTA XM EVC RTNOPTHY: CPT | Performed by: STUDENT IN AN ORGANIZED HEALTH CARE EDUCATION/TRAINING PROGRAM

## 2021-06-17 PROCEDURE — G8427 DOCREV CUR MEDS BY ELIG CLIN: HCPCS | Performed by: STUDENT IN AN ORGANIZED HEALTH CARE EDUCATION/TRAINING PROGRAM

## 2021-06-17 RX ORDER — ATORVASTATIN CALCIUM 10 MG/1
10 TABLET, FILM COATED ORAL DAILY
Qty: 30 TABLET | Refills: 3 | Status: SHIPPED | OUTPATIENT
Start: 2021-06-17

## 2021-06-17 RX ORDER — CLOTRIMAZOLE 1 %
CREAM (GRAM) TOPICAL
Qty: 113 G | Refills: 1 | Status: SHIPPED | OUTPATIENT
Start: 2021-06-17 | End: 2021-06-24

## 2021-06-17 ASSESSMENT — ENCOUNTER SYMPTOMS
COLOR CHANGE: 0
VOMITING: 0
COUGH: 0
SHORTNESS OF BREATH: 0
NAUSEA: 0

## 2021-06-17 NOTE — PATIENT INSTRUCTIONS
Thank you   1. Thank you for trusting us with your healthcare needs. You may receive a survey regarding today's visit. It would help us out if you would take a few moments to provide your feedback. We value your input. 2. Please bring in ALL medications BOTTLES, including inhalers, herbal supplements, over the counter, prescribed & non-prescribed medicine. The office would like actual medication bottles and a list.   3. Please note our OFFICE POLICIES:   a. Prior to getting your labs drawn, please check with your insurance company for benefits and eligibility of lab services. Often, insurance companies cover certain tests for preventative visits only. It is patient's responsibility to see what is covered. b. We are unable to change a diagnosis after the test has been performed. c. Lab orders will not be re-printed. Please hold onto your original lab orders and take them to your lab to be completed. d. If you no show your scheduled appointment three times, you will be dismissed from this practice. e. Elnita Likes must be completed 24 hours prior to your schedule appointment. 4. If the list below has been completed, PLEASE FAX RECORDS TO OUR OFFICE @ [phone]. Once the records have been received we will update your records at our office:  Health Maintenance Due   Topic Date Due    Pneumococcal 0-64 years Vaccine (1 of 2 - PPSV23) Never done    Diabetic foot exam  Never done    Diabetic retinal exam  Never done    Hepatitis B vaccine (1 of 3 - Risk 3-dose series) Never done    DTaP/Tdap/Td vaccine (1 - Tdap) Never done     Patient Education        Learning About Statins for People With Diabetes  Introduction  Statins are medicines that help with your cholesterol. Cholesterol is a type of fat in your blood. If you have too much of this fat, it can build up in blood vessels. This raises your risk of heart disease, heart attack, and stroke. Many people with diabetes take statins.  Diabetes can cause

## 2021-06-17 NOTE — TELEPHONE ENCOUNTER
Patient: Elaina Agarwal    Provider: Derrick Hameed from Dr Jordan Gallagher office says that they do not cover patients insurance for a  diabetic exam

## 2021-06-17 NOTE — PROGRESS NOTES
sounds. Musculoskeletal:      Right lower leg: No edema. Left lower leg: No edema. Skin:     General: Skin is warm and dry. Findings: Rash present. Comments: Scaly, pruritic skin noted between toes, sole of foot and heels bilateral feet. Neurological:      Mental Status: He is oriented to person, place, and time. Psychiatric:         Mood and Affect: Mood normal.         Behavior: Behavior normal.       Diabetic Foot Exam:  Visual inspection:  Deformity/amputation: absent  Skin lesions/pre-ulcerative calluses: absent  Edema: right- negative, left- negative    Sensory exam:  Monofilament sensation: normal  (minimum of 5 random plantar locations tested, avoiding callused areas - > 1 area with absence of sensation is + for neuropathy)    Plus at least one of the following:  Pulses: normal,   Pinprick: Intact  Proprioception: N/A  Vibration (128 Hz): N/A    Lab Results   Component Value Date    WBC 6.2 06/05/2021    HGB 13.2 (A) 06/05/2021    HCT 40.9 (A) 06/05/2021     06/05/2021    CHOL 160 06/05/2021    TRIG 77 06/05/2021    HDL 35 (L) 06/05/2021    LDLCALC 110 06/05/2021    AST 30 06/05/2021     06/05/2021    K 3.5 06/05/2021     06/05/2021    CREATININE 1.15 06/05/2021    BUN 16 06/05/2021    CO2 28 06/05/2021    TSH 0.75 06/05/2021    LABA1C 9.9 (H) 06/03/2021    LABGLOM >90 05/20/2012    CALCIUM 9.6 06/05/2021       Imaging Results:    No results found. Assessment / Plan:     Elvis Dolan was seen today for 2 week follow-up. Diagnoses and all orders for this visit:    Type 2 diabetes mellitus with microalbuminuria, with long-term current use of insulin ([address]):  - Doing well on insulin regimen. Has some high sugars in the AM prior to breakfast and Lantus dosage.  Will divide Lantus dosage - 25 units in the AM, 25 units in the PM. Advised to hold Lantus tonight given that patient took 50 units this AM.   - Continue with Humalog TID with meals  - Continue with lifestyle

## 2021-06-17 NOTE — LETTER
[address] [place] [address],[address] [facility] [address]  [facility] [address]  Phone: [phone]  Fax: [phone]    Thea Fay MD        June 17, 2021    To Whom it May Concern,     Talita Willingham has insulin-dependent Type 2 Diabetes Mellitus. As a patient dealing with this chronic condition, he requires specific dietary and medical care that he ideally cannot be compliant to living at [facility]. Patient is motivated to get his health under control but feels he is restricted given his living situation. Patient would benefit from being out of the [facility] in order to care for his condition.       Sincerely,        Electronically signed by Thea Fay MD on 6/17/2021 at 12:42 PM

## 2021-06-17 NOTE — PROGRESS NOTES
SUBJECTIVE     Jarred Martinez is a 41 y.o.male    Chief Complaint   Patient presents with    2 Week Follow-Up     Diabetes       Chief complaint, [place], and all pertinent details of the case reviewed with the resident. Please see resident's note for specific details discussed at today's visit. Patient Active Problem List   Diagnosis    Type 2 diabetes mellitus with microalbuminuria, with long-term current use of insulin (HCC)       Current Outpatient Medications   Medication Sig Dispense Refill    atorvastatin (LIPITOR) 10 MG tablet Take 1 tablet by mouth daily 30 tablet 3    clotrimazole (LOTRIMIN AF) 1 % cream Apply topically 2 times daily. 113 g 1    tadalafil (CIALIS) 10 MG tablet Take 1 tablet by mouth daily as needed for Erectile Dysfunction 30 tablet 0    cyclobenzaprine (FLEXERIL) 10 MG tablet Take 1 tablet by mouth nightly as needed for Muscle spasms 10 tablet 0    Insulin Pen Needle 31G X 8 MM MISC 1 each by Does not apply route daily 100 each 3    blood glucose monitor strips Test 3 times a day before meals & as needed for symptoms of irregular blood glucose. Dispense sufficient amount for indicated testing frequency plus additional to accommodate PRN testing needs.  300 strip 0    glucose monitoring kit (FREESTYLE) monitoring kit 1 kit by Does not apply route daily 1 kit 0    lisinopril (PRINIVIL;ZESTRIL) 10 MG tablet Take 1 tablet by mouth daily 30 tablet 0    Lancets MISC 1 each by Does not apply route 3 times daily 600 each 0    Alcohol Swabs PADS 1 box by Does not apply route 3 times daily 100 each 0    insulin glargine (LANTUS SOLOSTAR) 100 UNIT/ML injection pen Inject 50 Units into the skin nightly 15 pen 1    insulin lispro, 1 Unit Dial, (HUMALOG KWIKPEN) 100 UNIT/ML SOPN Inject 10 Units into the skin 3 times daily (before meals) Inject 10 units into skin 3 times daily with meals + 2units for 150-200; 4units 200-250; 6 units 300-350; 8 units  3 pen 1     No current facility-administered medications for this visit. Review of Systems per Dr. Herminia Bass    OBJECTIVE     /74 (Site: Left Upper Arm, Position: Sitting, Cuff Size: Large Adult)   Pulse 81   Temp 98.2 °F (36.8 °C) (Oral)   Resp 16   Ht 5' 11\" (1.803 m)   Wt 230 lb 3.2 oz (104.4 kg)   SpO2 98%   BMI 32.11 kg/m²   BP Readings from Last 3 Encounters:   06/17/21 120/74   06/15/21 132/66   06/10/21 124/72       Wt Readings from Last 3 Encounters:   06/17/21 230 lb 3.2 oz (104.4 kg)   06/15/21 230 lb 12.8 oz (104.7 kg)   06/10/21 230 lb 12.8 oz (104.7 kg)     Body mass index is 32.11 kg/m². Physical Exam  Vitals and nursing note reviewed. Constitutional:       Appearance: Normal appearance. He is well-developed and normal weight. HENT:      Head: Normocephalic and atraumatic. Cardiovascular:      Rate and Rhythm: Normal rate and regular rhythm. Heart sounds: Normal heart sounds. Pulmonary:      Effort: Pulmonary effort is normal.      Breath sounds: Normal breath sounds. Abdominal:      General: Bowel sounds are normal.      Palpations: Abdomen is soft. Skin:     General: Skin is warm and dry. Neurological:      Mental Status: He is alert. Deep Tendon Reflexes: Reflexes are normal and symmetric. Psychiatric:         Behavior: Behavior normal.         Thought Content: Thought content normal.         Judgment: Judgment normal.         No results found for this visit on 06/17/21.     Lab Results   Component Value Date    LABA1C 9.9 (H) 06/03/2021       Lab Results   Component Value Date    CHOL 160 06/05/2021    TRIG 77 06/05/2021    HDL 35 (L) 06/05/2021    LDLCALC 110 06/05/2021       The 10-year ASCVD risk score (Mark Brown et al., 2013) is: 10.3%    Values used to calculate the score:      Age: 41 years      Sex: Male      Is Non- : Yes      Diabetic: Yes      Tobacco smoker: Yes      Systolic Blood Pressure: 234 mmHg      Is BP treated: No HDL Cholesterol: 35 mg/dL      Total Cholesterol: 160 mg/dL    Lab Results   Component Value Date     06/05/2021    K 3.5 06/05/2021     06/05/2021    CO2 28 06/05/2021    BUN 16 06/05/2021    CREATININE 1.15 06/05/2021    GLUCOSE 58 06/05/2021    CALCIUM 9.6 06/05/2021    LABALBU 4.5 06/05/2021    BILITOT 0.4 06/05/2021    ALKPHOS 75 06/05/2021    AST 30 06/05/2021    ALT 18 06/05/2021    LABGLOM >90 05/20/2012     Component      Latest Ref Rng & Units 6/3/2021           3:43 PM   MICROALBUMIN POC      mg/L 80   POC Creatinine      mg/dL 300   Microalb/Creat Ratio POC      <30 mg/g 30 - 300 (A)         Lab Results   Component Value Date    TSH 0.75 06/05/2021    T4FREE 0.99 06/05/2021       Lab Results   Component Value Date    WBC 6.2 06/05/2021    HGB 13.2 (A) 06/05/2021    HCT 40.9 (A) 06/05/2021    MCV 79 (L) 06/05/2021     06/05/2021           Immunization History   Administered Date(s) Administered    COVID-19, J&J, PF, 0.5 mL 03/13/2021       Health Maintenance   Topic Date Due    Pneumococcal 0-64 years Vaccine (1 of 2 - PPSV23) Never done    Diabetic retinal exam  Never done    Hepatitis B vaccine (1 of 3 - Risk 3-dose series) Never done    DTaP/Tdap/Td vaccine (1 - Tdap) Never done    Hepatitis C screen  06/03/2022 (Originally 1980)    HIV screen  06/03/2022 (Originally 12/12/1993)    Flu vaccine (Season Ended) 09/01/2021    A1C test (Diabetic or Prediabetic)  09/03/2021    Lipid screen  06/05/2022    Potassium monitoring  06/05/2022    Creatinine monitoring  06/05/2022    Diabetic foot exam  06/17/2022    COVID-19 Vaccine  Completed    Hepatitis A vaccine  Aged Out    Hib vaccine  Aged Out    Meningococcal (ACWY) vaccine  Aged Out              Future Appointments   Date Time Provider Danyel Herndon   7/29/2021  2:20 PM Josh Kauffman MD [facility] [id number] [facility]       ASSESSMENT       Diagnosis Orders   1.  Type 2 diabetes mellitus with microalbuminuria, with

## 2021-06-17 NOTE — PROGRESS NOTES
After pharmacist chart review, the following recommendations are made:    -Consider addition of metformin for additional blood sugar control without risk for hypoglycemia.     For Pharmacy Admin Tracking Only     Intervention Detail: New Rx: 1, reason: Needs Additional Therapy   Total # of Interventions Recommended: 1   Total # of Interventions Accepted: 0   Time Spent (min): 20      Electronically signed by Arti Keller [facility] on 6/17/2021 at 3:11 PM

## 2021-07-16 ENCOUNTER — CARE COORDINATION (OUTPATIENT)
Dept: CARE COORDINATION | Age: 41
End: 2021-07-16

## 2021-07-16 NOTE — CARE COORDINATION
Called pt to discuss food insecurities. Pt was unavailable at the time of my call and vm is not set up. Will try again next week.

## 2021-07-19 ENCOUNTER — TELEPHONE (OUTPATIENT)
Dept: FAMILY MEDICINE CLINIC | Age: 41
End: 2021-07-19

## 2021-07-19 DIAGNOSIS — R80.9 TYPE 2 DIABETES MELLITUS WITH MICROALBUMINURIA, WITH LONG-TERM CURRENT USE OF INSULIN (HCC): Primary | ICD-10-CM

## 2021-07-19 DIAGNOSIS — Z79.4 TYPE 2 DIABETES MELLITUS WITH MICROALBUMINURIA, WITH LONG-TERM CURRENT USE OF INSULIN (HCC): Primary | ICD-10-CM

## 2021-07-19 DIAGNOSIS — E11.29 TYPE 2 DIABETES MELLITUS WITH MICROALBUMINURIA, WITH LONG-TERM CURRENT USE OF INSULIN (HCC): Primary | ICD-10-CM

## 2021-07-19 NOTE — TELEPHONE ENCOUNTER
Patient: Anthony khan      Patient says that he is only getting refills that provides him with one needle a day and he uses four a day. Patient would like for you all to increase refills on his insulin needles.

## 2021-07-20 ENCOUNTER — TELEPHONE (OUTPATIENT)
Dept: FAMILY MEDICINE CLINIC | Age: 41
End: 2021-07-20

## 2021-07-20 NOTE — TELEPHONE ENCOUNTER
Pt called states he is needed Humalog, lantus and pen needles refilled. Pt states he will be out of pen needles tonight. [facility] verified. Pt states he moved to [place] but still wants to come to the office since Dr Anai Castorena is so thorough. Please advise/send.

## 2021-07-20 NOTE — TELEPHONE ENCOUNTER
Patient :Bigg khan         Patient was calling to check on the status of his refills for his needles and would like to know if you all increased the dosage since he uses four needles a day.  Patient would [facility]

## 2021-07-20 NOTE — TELEPHONE ENCOUNTER
Can we please call patient and clarify what he needs refills for? The actual insulin or the lancets?

## 2021-07-21 RX ORDER — INSULIN GLARGINE 100 [IU]/ML
50 INJECTION, SOLUTION SUBCUTANEOUS NIGHTLY
Qty: 15 PEN | Refills: 2 | Status: SHIPPED | OUTPATIENT
Start: 2021-07-21 | End: 2021-08-11 | Stop reason: SDUPTHER

## 2021-07-21 RX ORDER — INSULIN LISPRO 100 [IU]/ML
10 INJECTION, SOLUTION INTRAVENOUS; SUBCUTANEOUS
Qty: 3 PEN | Refills: 3 | Status: SHIPPED | OUTPATIENT
Start: 2021-07-21

## 2021-07-21 NOTE — TELEPHONE ENCOUNTER
Refills sent to pharmacy on file. Thank you.      Electronically signed by Rajni Hanna MD on 7/21/2021 at 8:53 AM

## 2021-07-22 ENCOUNTER — CARE COORDINATION (OUTPATIENT)
Dept: CARE COORDINATION | Age: 41
End: 2021-07-22

## 2021-07-22 NOTE — CARE COORDINATION
Called pt to discuss food insecurities. Introduced self and my role. Pt stated he received a box of food and is \"still struggling. \" Pt has moved back to [place] with his wife. He stated she is aware of the local food [employer] in [place]. Pt would like to keep Dr. Edu Wang as his PCP so I encouraged him on 7/29 when he has his next appt to take advantage of the [address]. Informed pt that I am mailing out a list of local gaspar along with a couple food [facility] in [place]. Encouraged pt to contact me if I can be of further serviced. Pt appreciative. Active listening provided.

## 2021-09-08 DIAGNOSIS — R80.9 TYPE 2 DIABETES MELLITUS WITH MICROALBUMINURIA, WITH LONG-TERM CURRENT USE OF INSULIN (HCC): ICD-10-CM

## 2021-09-08 DIAGNOSIS — Z79.4 TYPE 2 DIABETES MELLITUS WITH MICROALBUMINURIA, WITH LONG-TERM CURRENT USE OF INSULIN (HCC): ICD-10-CM

## 2021-09-08 DIAGNOSIS — E11.29 TYPE 2 DIABETES MELLITUS WITH MICROALBUMINURIA, WITH LONG-TERM CURRENT USE OF INSULIN (HCC): ICD-10-CM

## 2021-09-08 RX ORDER — INSULIN GLARGINE 100 [IU]/ML
50 INJECTION, SOLUTION SUBCUTANEOUS NIGHTLY
Qty: 15 PEN | Refills: 2 | Status: SHIPPED | OUTPATIENT
Start: 2021-09-08

## 2021-09-08 NOTE — TELEPHONE ENCOUNTER
Patient's last appointment was : 6/17/2021  Patient's next appointment is : Visit date not found  Last refilled:8/11/2021

## 2021-10-21 ENCOUNTER — TELEPHONE (OUTPATIENT)
Dept: FAMILY MEDICINE CLINIC | Age: 41
End: 2021-10-21

## 2021-10-21 DIAGNOSIS — Z79.4 TYPE 2 DIABETES MELLITUS WITH MICROALBUMINURIA, WITH LONG-TERM CURRENT USE OF INSULIN (HCC): ICD-10-CM

## 2021-10-21 DIAGNOSIS — R80.9 TYPE 2 DIABETES MELLITUS WITH MICROALBUMINURIA, WITH LONG-TERM CURRENT USE OF INSULIN (HCC): ICD-10-CM

## 2021-10-21 DIAGNOSIS — E11.29 TYPE 2 DIABETES MELLITUS WITH MICROALBUMINURIA, WITH LONG-TERM CURRENT USE OF INSULIN (HCC): ICD-10-CM

## 2021-10-21 RX ORDER — INSULIN GLARGINE 100 [IU]/ML
50 INJECTION, SOLUTION SUBCUTANEOUS NIGHTLY
Qty: 15 PEN | Refills: 2 | OUTPATIENT
Start: 2021-10-21

## 2021-10-21 RX ORDER — GLUCOSAMINE HCL/CHONDROITIN SU 500-400 MG
CAPSULE ORAL
Qty: 300 STRIP | Refills: 0 | Status: CANCELLED | OUTPATIENT
Start: 2021-10-21

## 2021-10-21 RX ORDER — BLOOD-GLUCOSE METER
1 KIT MISCELLANEOUS DAILY
Qty: 1 KIT | Refills: 0 | Status: CANCELLED | OUTPATIENT
Start: 2021-10-21

## 2021-10-21 RX ORDER — INSULIN LISPRO 100 [IU]/ML
10 INJECTION, SOLUTION INTRAVENOUS; SUBCUTANEOUS
Qty: 3 PEN | Refills: 3 | OUTPATIENT
Start: 2021-10-21

## 2021-10-21 NOTE — TELEPHONE ENCOUNTER
Will need the test strips and monitoring kit [facility] [place]. Thank you!

## 2021-10-21 NOTE — TELEPHONE ENCOUNTER
Pt called with questions why his test strips were not the same as the ones he had before while in senior living. He states that he usually had the brand \"ASSURE PRISM\" and the new strips do not fit the monitor he has at home. He stated that the pharmacy did not give him enough insulin pens and wanted a refill, I sent in a refill request. Informed the patient that I would call the pharmacy and verify why he has gotten a new test strip brand and why he got a limited supply of insulin pens.

## 2021-10-21 NOTE — TELEPHONE ENCOUNTER
Spoke to [facility]. They stated that the brand of strips dispensed is 1 touch verio. States that they also dispensed 6 doses of lantus.

## 2021-10-21 NOTE — TELEPHONE ENCOUNTER
Spoke to [facility] [employer] and they informed me that they do not accept [employer] and he was transferred to [facility].

## 2024-10-11 NOTE — ADDENDUM NOTE
Addended by: Orlando Guillen on: 6/17/2021 05:55 PM     Modules accepted: Level of Service Render Note In Bullet Format When Appropriate: No Duration Of Freeze Thaw-Cycle (Seconds): 0 Post-Care Instructions: I reviewed with the patient in detail post-care instructions. Patient is to wear sunprotection, and avoid picking at any of the treated lesions. Pt may apply Vaseline to crusted or scabbing areas. Show Aperture Variable?: Yes Consent: The patient's consent was obtained including but not limited to risks of crusting, scabbing, blistering, scarring, darker or lighter pigmentary change, recurrence, incomplete removal and infection. Detail Level: Detailed